# Patient Record
Sex: FEMALE | Race: WHITE | NOT HISPANIC OR LATINO | Employment: FULL TIME | URBAN - METROPOLITAN AREA
[De-identification: names, ages, dates, MRNs, and addresses within clinical notes are randomized per-mention and may not be internally consistent; named-entity substitution may affect disease eponyms.]

---

## 2017-09-28 ENCOUNTER — ANESTHESIA EVENT (OUTPATIENT)
Dept: SURGERY | Facility: CLINIC | Age: 47
End: 2017-09-28
Payer: COMMERCIAL

## 2017-09-28 NOTE — ANESTHESIA PREPROCEDURE EVALUATION
Anesthesia Evaluation     . Pt has had prior anesthetic. Type: General    History of anesthetic complications   - PONV        ROS/MED HX    ENT/Pulmonary:  - neg pulmonary ROS     Neurologic:       Cardiovascular:  - neg cardiovascular ROS       METS/Exercise Tolerance:     Hematologic:     (+) Other Hematologic Disorder-iron deficiency; on supplements      Musculoskeletal:         GI/Hepatic:  - neg GI/hepatic ROS       Renal/Genitourinary:         Endo:         Psychiatric:         Infectious Disease:         Malignancy:         Other:                     Physical Exam  Normal systems: cardiovascular, pulmonary and dental    Airway   Mallampati: I  TM distance: >3 FB  Neck ROM: full    Dental     Cardiovascular   Rhythm and rate: regular and normal      Pulmonary    breath sounds clear to auscultation                    Anesthesia Plan      History & Physical Review  History and physical reviewed and following examination; no interval change.    ASA Status:  2 .    NPO Status:  > 8 hours    Plan for General and ETT with Propofol and Intravenous induction. Maintenance will be TIVA.    PONV prophylaxis:  Ondansetron (or other 5HT-3) and Dexamethasone or Solumedrol  Tordal, Ketamine (0.15mg/kg pre-incision and q1H until emergence)      Postoperative Care  Postoperative pain management:  IV analgesics and Oral pain medications.      Consents  Anesthetic plan, risks, benefits and alternatives discussed with:  Patient..                          .  DPreop diagnosis: MENORRHAGIA AND SUBMUCOUS FIBROID   Procedure(s):  LAPAROSCOPIC ASSISTED HYSTERECTOMY VAGINAL  LAPAROSCOPIC SALPINGO-OOPHORECTOMY  LAPAROSCOPIC APPENDECTOMY  Allergies   Allergen Reactions     Sulfa Drugs        No current facility-administered medications on file prior to encounter.   No current outpatient prescriptions on file prior to encounter.  Hemoglobin   Date Value Ref Range Status   09/29/2017 14.2 11.7 - 15.7 g/dL Final

## 2017-09-29 ENCOUNTER — SURGERY (OUTPATIENT)
Age: 47
End: 2017-09-29

## 2017-09-29 ENCOUNTER — HOSPITAL ENCOUNTER (OUTPATIENT)
Facility: CLINIC | Age: 47
Discharge: HOME | End: 2017-09-29
Attending: OBSTETRICS & GYNECOLOGY | Admitting: OBSTETRICS & GYNECOLOGY
Payer: COMMERCIAL

## 2017-09-29 ENCOUNTER — ANESTHESIA (OUTPATIENT)
Dept: SURGERY | Facility: CLINIC | Age: 47
End: 2017-09-29
Payer: COMMERCIAL

## 2017-09-29 VITALS
DIASTOLIC BLOOD PRESSURE: 68 MMHG | OXYGEN SATURATION: 95 % | TEMPERATURE: 97.7 F | SYSTOLIC BLOOD PRESSURE: 104 MMHG | HEIGHT: 67 IN | BODY MASS INDEX: 23.61 KG/M2 | RESPIRATION RATE: 16 BRPM | WEIGHT: 150.4 LBS | HEART RATE: 74 BPM

## 2017-09-29 DIAGNOSIS — R58 EXCESSIVE BLEEDING: Primary | ICD-10-CM

## 2017-09-29 PROBLEM — N92.0 EXCESSIVE OR FREQUENT MENSTRUATION: Status: ACTIVE | Noted: 2017-09-29

## 2017-09-29 PROBLEM — D50.0 IRON DEFICIENCY ANEMIA DUE TO CHRONIC BLOOD LOSS: Status: ACTIVE | Noted: 2017-09-29

## 2017-09-29 LAB
ABO + RH BLD: NORMAL
ABO + RH BLD: NORMAL
BLD GP AB SCN SERPL QL: NORMAL
BLOOD BANK CMNT PATIENT-IMP: NORMAL
ERYTHROCYTE [DISTWIDTH] IN BLOOD BY AUTOMATED COUNT: 13.6 % (ref 10–15)
HCG SERPL QL: NEGATIVE
HCT VFR BLD AUTO: 40.4 % (ref 35–47)
HGB BLD-MCNC: 14.2 G/DL (ref 11.7–15.7)
MCH RBC QN AUTO: 30.2 PG (ref 26.5–33)
MCHC RBC AUTO-ENTMCNC: 35.1 G/DL (ref 31.5–36.5)
MCV RBC AUTO: 86 FL (ref 78–100)
PLATELET # BLD AUTO: 187 10E9/L (ref 150–450)
RBC # BLD AUTO: 4.7 10E12/L (ref 3.8–5.2)
SPECIMEN EXP DATE BLD: NORMAL
WBC # BLD AUTO: 5.9 10E9/L (ref 4–11)

## 2017-09-29 PROCEDURE — 25000125 ZZHC RX 250: Performed by: NURSE ANESTHETIST, CERTIFIED REGISTERED

## 2017-09-29 PROCEDURE — 25000125 ZZHC RX 250: Performed by: OBSTETRICS & GYNECOLOGY

## 2017-09-29 PROCEDURE — 88307 TISSUE EXAM BY PATHOLOGIST: CPT | Performed by: OBSTETRICS & GYNECOLOGY

## 2017-09-29 PROCEDURE — 85027 COMPLETE CBC AUTOMATED: CPT | Performed by: OBSTETRICS & GYNECOLOGY

## 2017-09-29 PROCEDURE — 88302 TISSUE EXAM BY PATHOLOGIST: CPT | Performed by: OBSTETRICS & GYNECOLOGY

## 2017-09-29 PROCEDURE — 25000128 H RX IP 250 OP 636: Performed by: ANESTHESIOLOGY

## 2017-09-29 PROCEDURE — 25000132 ZZH RX MED GY IP 250 OP 250 PS 637: Performed by: OBSTETRICS & GYNECOLOGY

## 2017-09-29 PROCEDURE — 25000128 H RX IP 250 OP 636: Performed by: OBSTETRICS & GYNECOLOGY

## 2017-09-29 PROCEDURE — 36000069 ZZH SURGERY LEVEL 5 EA 15 ADDTL MIN: Performed by: OBSTETRICS & GYNECOLOGY

## 2017-09-29 PROCEDURE — 27210794 ZZH OR GENERAL SUPPLY STERILE: Performed by: OBSTETRICS & GYNECOLOGY

## 2017-09-29 PROCEDURE — 84703 CHORIONIC GONADOTROPIN ASSAY: CPT | Performed by: OBSTETRICS & GYNECOLOGY

## 2017-09-29 PROCEDURE — 37000008 ZZH ANESTHESIA TECHNICAL FEE, 1ST 30 MIN: Performed by: OBSTETRICS & GYNECOLOGY

## 2017-09-29 PROCEDURE — 86900 BLOOD TYPING SEROLOGIC ABO: CPT | Performed by: OBSTETRICS & GYNECOLOGY

## 2017-09-29 PROCEDURE — 88302 TISSUE EXAM BY PATHOLOGIST: CPT | Mod: 26 | Performed by: OBSTETRICS & GYNECOLOGY

## 2017-09-29 PROCEDURE — 71000012 ZZH RECOVERY PHASE 1 LEVEL 1 FIRST HR: Performed by: OBSTETRICS & GYNECOLOGY

## 2017-09-29 PROCEDURE — 40000170 ZZH STATISTIC PRE-PROCEDURE ASSESSMENT II: Performed by: OBSTETRICS & GYNECOLOGY

## 2017-09-29 PROCEDURE — 86901 BLOOD TYPING SEROLOGIC RH(D): CPT | Performed by: OBSTETRICS & GYNECOLOGY

## 2017-09-29 PROCEDURE — 36000067 ZZH SURGERY LEVEL 5 1ST 30 MIN: Performed by: OBSTETRICS & GYNECOLOGY

## 2017-09-29 PROCEDURE — 86850 RBC ANTIBODY SCREEN: CPT | Performed by: OBSTETRICS & GYNECOLOGY

## 2017-09-29 PROCEDURE — 36415 COLL VENOUS BLD VENIPUNCTURE: CPT | Performed by: OBSTETRICS & GYNECOLOGY

## 2017-09-29 PROCEDURE — 27210995 ZZH RX 272: Performed by: OBSTETRICS & GYNECOLOGY

## 2017-09-29 PROCEDURE — 71000013 ZZH RECOVERY PHASE 1 LEVEL 1 EA ADDTL HR: Performed by: OBSTETRICS & GYNECOLOGY

## 2017-09-29 PROCEDURE — 25000128 H RX IP 250 OP 636: Performed by: NURSE ANESTHETIST, CERTIFIED REGISTERED

## 2017-09-29 PROCEDURE — 37000009 ZZH ANESTHESIA TECHNICAL FEE, EACH ADDTL 15 MIN: Performed by: OBSTETRICS & GYNECOLOGY

## 2017-09-29 PROCEDURE — 88307 TISSUE EXAM BY PATHOLOGIST: CPT | Mod: 26 | Performed by: OBSTETRICS & GYNECOLOGY

## 2017-09-29 PROCEDURE — 25800025 ZZH RX 258: Performed by: OBSTETRICS & GYNECOLOGY

## 2017-09-29 PROCEDURE — 25000132 ZZH RX MED GY IP 250 OP 250 PS 637: Performed by: ANESTHESIOLOGY

## 2017-09-29 PROCEDURE — 88305 TISSUE EXAM BY PATHOLOGIST: CPT | Performed by: OBSTETRICS & GYNECOLOGY

## 2017-09-29 PROCEDURE — 88305 TISSUE EXAM BY PATHOLOGIST: CPT | Mod: 26 | Performed by: OBSTETRICS & GYNECOLOGY

## 2017-09-29 RX ORDER — PROPOFOL 10 MG/ML
INJECTION, EMULSION INTRAVENOUS CONTINUOUS PRN
Status: DISCONTINUED | OUTPATIENT
Start: 2017-09-29 | End: 2017-09-29

## 2017-09-29 RX ORDER — EPHEDRINE SULFATE 50 MG/ML
INJECTION, SOLUTION INTRAMUSCULAR; INTRAVENOUS; SUBCUTANEOUS PRN
Status: DISCONTINUED | OUTPATIENT
Start: 2017-09-29 | End: 2017-09-29

## 2017-09-29 RX ORDER — OXYCODONE AND ACETAMINOPHEN 5; 325 MG/1; MG/1
1-2 TABLET ORAL
Status: COMPLETED | OUTPATIENT
Start: 2017-09-29 | End: 2017-09-29

## 2017-09-29 RX ORDER — MAGNESIUM HYDROXIDE 1200 MG/15ML
LIQUID ORAL PRN
Status: DISCONTINUED | OUTPATIENT
Start: 2017-09-29 | End: 2017-09-29 | Stop reason: HOSPADM

## 2017-09-29 RX ORDER — NEOSTIGMINE METHYLSULFATE 1 MG/ML
VIAL (ML) INJECTION PRN
Status: DISCONTINUED | OUTPATIENT
Start: 2017-09-29 | End: 2017-09-29

## 2017-09-29 RX ORDER — FENTANYL CITRATE 50 UG/ML
INJECTION, SOLUTION INTRAMUSCULAR; INTRAVENOUS PRN
Status: DISCONTINUED | OUTPATIENT
Start: 2017-09-29 | End: 2017-09-29

## 2017-09-29 RX ORDER — ESTRADIOL 0.05 MG/D
1 PATCH, EXTENDED RELEASE TRANSDERMAL
Qty: 24 PATCH | Refills: 3 | Status: SHIPPED | OUTPATIENT
Start: 2017-10-02 | End: 2023-04-10

## 2017-09-29 RX ORDER — ONDANSETRON 2 MG/ML
4 INJECTION INTRAMUSCULAR; INTRAVENOUS EVERY 30 MIN PRN
Status: DISCONTINUED | OUTPATIENT
Start: 2017-09-29 | End: 2017-09-29 | Stop reason: HOSPADM

## 2017-09-29 RX ORDER — ACETAMINOPHEN 325 MG/1
975 TABLET ORAL ONCE
Status: COMPLETED | OUTPATIENT
Start: 2017-09-29 | End: 2017-09-29

## 2017-09-29 RX ORDER — HYDROXYZINE HYDROCHLORIDE 25 MG/1
50 TABLET, FILM COATED ORAL ONCE
Status: COMPLETED | OUTPATIENT
Start: 2017-09-29 | End: 2017-09-29

## 2017-09-29 RX ORDER — IBUPROFEN 600 MG/1
600 TABLET, FILM COATED ORAL EVERY 6 HOURS PRN
Qty: 90 TABLET | Refills: 3 | Status: SHIPPED | OUTPATIENT
Start: 2017-09-29 | End: 2023-04-10

## 2017-09-29 RX ORDER — MEPERIDINE HYDROCHLORIDE 25 MG/ML
12.5 INJECTION INTRAMUSCULAR; INTRAVENOUS; SUBCUTANEOUS
Status: DISCONTINUED | OUTPATIENT
Start: 2017-09-29 | End: 2017-09-29 | Stop reason: HOSPADM

## 2017-09-29 RX ORDER — ONDANSETRON 4 MG/1
4 TABLET, ORALLY DISINTEGRATING ORAL EVERY 30 MIN PRN
Status: DISCONTINUED | OUTPATIENT
Start: 2017-09-29 | End: 2017-09-29 | Stop reason: HOSPADM

## 2017-09-29 RX ORDER — GLYCOPYRROLATE 0.2 MG/ML
INJECTION, SOLUTION INTRAMUSCULAR; INTRAVENOUS PRN
Status: DISCONTINUED | OUTPATIENT
Start: 2017-09-29 | End: 2017-09-29

## 2017-09-29 RX ORDER — CEFAZOLIN SODIUM 1 G/3ML
1 INJECTION, POWDER, FOR SOLUTION INTRAMUSCULAR; INTRAVENOUS SEE ADMIN INSTRUCTIONS
Status: DISCONTINUED | OUTPATIENT
Start: 2017-09-29 | End: 2017-09-29 | Stop reason: HOSPADM

## 2017-09-29 RX ORDER — KETOROLAC TROMETHAMINE 30 MG/ML
INJECTION, SOLUTION INTRAMUSCULAR; INTRAVENOUS PRN
Status: DISCONTINUED | OUTPATIENT
Start: 2017-09-29 | End: 2017-09-29

## 2017-09-29 RX ORDER — OXYCODONE AND ACETAMINOPHEN 5; 325 MG/1; MG/1
1-2 TABLET ORAL EVERY 4 HOURS PRN
Qty: 30 TABLET | Refills: 0 | Status: SHIPPED | OUTPATIENT
Start: 2017-09-29 | End: 2023-04-10

## 2017-09-29 RX ORDER — CEFAZOLIN SODIUM 2 G/100ML
2 INJECTION, SOLUTION INTRAVENOUS
Status: COMPLETED | OUTPATIENT
Start: 2017-09-29 | End: 2017-09-29

## 2017-09-29 RX ORDER — PROPOFOL 10 MG/ML
INJECTION, EMULSION INTRAVENOUS PRN
Status: DISCONTINUED | OUTPATIENT
Start: 2017-09-29 | End: 2017-09-29

## 2017-09-29 RX ORDER — LIDOCAINE HYDROCHLORIDE AND EPINEPHRINE 10; 10 MG/ML; UG/ML
INJECTION, SOLUTION INFILTRATION; PERINEURAL
Status: DISCONTINUED
Start: 2017-09-29 | End: 2017-09-29 | Stop reason: WASHOUT

## 2017-09-29 RX ORDER — OXYCODONE AND ACETAMINOPHEN 5; 325 MG/1; MG/1
1 TABLET ORAL ONCE
Status: COMPLETED | OUTPATIENT
Start: 2017-09-29 | End: 2017-09-29

## 2017-09-29 RX ORDER — FENTANYL CITRATE 50 UG/ML
25-50 INJECTION, SOLUTION INTRAMUSCULAR; INTRAVENOUS
Status: DISCONTINUED | OUTPATIENT
Start: 2017-09-29 | End: 2017-09-29 | Stop reason: HOSPADM

## 2017-09-29 RX ORDER — NALOXONE HYDROCHLORIDE 0.4 MG/ML
.1-.4 INJECTION, SOLUTION INTRAMUSCULAR; INTRAVENOUS; SUBCUTANEOUS
Status: DISCONTINUED | OUTPATIENT
Start: 2017-09-29 | End: 2017-09-29 | Stop reason: HOSPADM

## 2017-09-29 RX ORDER — ACYCLOVIR 200 MG/1
CAPSULE ORAL
Status: DISCONTINUED
Start: 2017-09-29 | End: 2017-09-29 | Stop reason: HOSPADM

## 2017-09-29 RX ORDER — CEPHALEXIN 500 MG/1
500 CAPSULE ORAL 2 TIMES DAILY
Qty: 10 CAPSULE | Refills: 0 | Status: SHIPPED | OUTPATIENT
Start: 2017-09-29 | End: 2017-10-04

## 2017-09-29 RX ORDER — HYDROMORPHONE HYDROCHLORIDE 1 MG/ML
.3-.5 INJECTION, SOLUTION INTRAMUSCULAR; INTRAVENOUS; SUBCUTANEOUS EVERY 10 MIN PRN
Status: DISCONTINUED | OUTPATIENT
Start: 2017-09-29 | End: 2017-09-29 | Stop reason: HOSPADM

## 2017-09-29 RX ORDER — FENTANYL CITRATE 50 UG/ML
25-50 INJECTION, SOLUTION INTRAMUSCULAR; INTRAVENOUS EVERY 5 MIN PRN
Status: DISCONTINUED | OUTPATIENT
Start: 2017-09-29 | End: 2017-09-29 | Stop reason: HOSPADM

## 2017-09-29 RX ORDER — LIDOCAINE HYDROCHLORIDE 20 MG/ML
INJECTION, SOLUTION INFILTRATION; PERINEURAL PRN
Status: DISCONTINUED | OUTPATIENT
Start: 2017-09-29 | End: 2017-09-29

## 2017-09-29 RX ORDER — ONDANSETRON 2 MG/ML
INJECTION INTRAMUSCULAR; INTRAVENOUS PRN
Status: DISCONTINUED | OUTPATIENT
Start: 2017-09-29 | End: 2017-09-29

## 2017-09-29 RX ORDER — CALCIUM CARBONATE 500(1250)
1 TABLET ORAL 2 TIMES DAILY
COMMUNITY

## 2017-09-29 RX ORDER — IBUPROFEN 600 MG/1
600 TABLET, FILM COATED ORAL
Status: DISCONTINUED | OUTPATIENT
Start: 2017-09-29 | End: 2017-09-29 | Stop reason: HOSPADM

## 2017-09-29 RX ORDER — SODIUM CHLORIDE, SODIUM LACTATE, POTASSIUM CHLORIDE, CALCIUM CHLORIDE 600; 310; 30; 20 MG/100ML; MG/100ML; MG/100ML; MG/100ML
INJECTION, SOLUTION INTRAVENOUS CONTINUOUS
Status: DISCONTINUED | OUTPATIENT
Start: 2017-09-29 | End: 2017-09-29 | Stop reason: HOSPADM

## 2017-09-29 RX ORDER — KETOROLAC TROMETHAMINE 30 MG/ML
30 INJECTION, SOLUTION INTRAMUSCULAR; INTRAVENOUS EVERY 6 HOURS PRN
Status: DISCONTINUED | OUTPATIENT
Start: 2017-09-29 | End: 2017-09-29 | Stop reason: HOSPADM

## 2017-09-29 RX ORDER — SODIUM CHLORIDE, SODIUM LACTATE, POTASSIUM CHLORIDE, CALCIUM CHLORIDE 600; 310; 30; 20 MG/100ML; MG/100ML; MG/100ML; MG/100ML
INJECTION, SOLUTION INTRAVENOUS CONTINUOUS PRN
Status: DISCONTINUED | OUTPATIENT
Start: 2017-09-29 | End: 2017-09-29

## 2017-09-29 RX ORDER — DEXAMETHASONE SODIUM PHOSPHATE 10 MG/ML
INJECTION, SOLUTION INTRAMUSCULAR; INTRAVENOUS PRN
Status: DISCONTINUED | OUTPATIENT
Start: 2017-09-29 | End: 2017-09-29

## 2017-09-29 RX ADMIN — Medication 1 APPLICATOR: at 09:45

## 2017-09-29 RX ADMIN — HYDROXYZINE HYDROCHLORIDE 50 MG: 25 TABLET ORAL at 07:11

## 2017-09-29 RX ADMIN — PROPOFOL 200 MG: 10 INJECTION, EMULSION INTRAVENOUS at 07:39

## 2017-09-29 RX ADMIN — FENTANYL CITRATE 25 MCG: 50 INJECTION, SOLUTION INTRAMUSCULAR; INTRAVENOUS at 11:43

## 2017-09-29 RX ADMIN — LIDOCAINE HYDROCHLORIDE 80 MG: 20 INJECTION, SOLUTION INFILTRATION; PERINEURAL at 07:39

## 2017-09-29 RX ADMIN — SODIUM CHLORIDE, POTASSIUM CHLORIDE, SODIUM LACTATE AND CALCIUM CHLORIDE: 600; 310; 30; 20 INJECTION, SOLUTION INTRAVENOUS at 08:59

## 2017-09-29 RX ADMIN — FENTANYL CITRATE 50 MCG: 50 INJECTION, SOLUTION INTRAMUSCULAR; INTRAVENOUS at 07:39

## 2017-09-29 RX ADMIN — SODIUM CHLORIDE, POTASSIUM CHLORIDE, SODIUM LACTATE AND CALCIUM CHLORIDE: 600; 310; 30; 20 INJECTION, SOLUTION INTRAVENOUS at 07:36

## 2017-09-29 RX ADMIN — SODIUM CHLORIDE 1000 ML: 0.9 IRRIGANT IRRIGATION at 08:22

## 2017-09-29 RX ADMIN — DEXMEDETOMIDINE HYDROCHLORIDE 8 MCG: 100 INJECTION, SOLUTION INTRAVENOUS at 08:02

## 2017-09-29 RX ADMIN — FENTANYL CITRATE 25 MCG: 50 INJECTION, SOLUTION INTRAMUSCULAR; INTRAVENOUS at 09:35

## 2017-09-29 RX ADMIN — MIDAZOLAM HYDROCHLORIDE 2 MG: 1 INJECTION, SOLUTION INTRAMUSCULAR; INTRAVENOUS at 07:36

## 2017-09-29 RX ADMIN — ACETAMINOPHEN 975 MG: 325 TABLET, FILM COATED ORAL at 07:10

## 2017-09-29 RX ADMIN — ONDANSETRON 4 MG: 2 INJECTION INTRAMUSCULAR; INTRAVENOUS at 09:39

## 2017-09-29 RX ADMIN — VASOPRESSIN 10 ML: 20 INJECTION INTRAMUSCULAR; SUBCUTANEOUS at 09:14

## 2017-09-29 RX ADMIN — ROCURONIUM BROMIDE 10 MG: 10 INJECTION INTRAVENOUS at 08:58

## 2017-09-29 RX ADMIN — OXYCODONE HYDROCHLORIDE AND ACETAMINOPHEN 1 TABLET: 5; 325 TABLET ORAL at 11:19

## 2017-09-29 RX ADMIN — FENTANYL CITRATE 25 MCG: 50 INJECTION, SOLUTION INTRAMUSCULAR; INTRAVENOUS at 07:57

## 2017-09-29 RX ADMIN — CEFAZOLIN SODIUM 2 G: 2 INJECTION, SOLUTION INTRAVENOUS at 07:45

## 2017-09-29 RX ADMIN — DEXMEDETOMIDINE HYDROCHLORIDE 8 MCG: 100 INJECTION, SOLUTION INTRAVENOUS at 08:07

## 2017-09-29 RX ADMIN — GLYCOPYRROLATE 0.4 MG: 0.2 INJECTION, SOLUTION INTRAMUSCULAR; INTRAVENOUS at 09:51

## 2017-09-29 RX ADMIN — FENTANYL CITRATE 50 MCG: 50 INJECTION, SOLUTION INTRAMUSCULAR; INTRAVENOUS at 10:22

## 2017-09-29 RX ADMIN — ROCURONIUM BROMIDE 30 MG: 10 INJECTION INTRAVENOUS at 07:39

## 2017-09-29 RX ADMIN — KETOROLAC TROMETHAMINE 30 MG: 30 INJECTION, SOLUTION INTRAMUSCULAR at 09:39

## 2017-09-29 RX ADMIN — OXYCODONE HYDROCHLORIDE AND ACETAMINOPHEN 1 TABLET: 5; 325 TABLET ORAL at 12:47

## 2017-09-29 RX ADMIN — SODIUM CHLORIDE 1000 ML: 900 IRRIGANT IRRIGATION at 09:15

## 2017-09-29 RX ADMIN — PROPOFOL 150 MCG/KG/MIN: 10 INJECTION, EMULSION INTRAVENOUS at 08:22

## 2017-09-29 RX ADMIN — CONJUGATED ESTROGENS 5 G: 0.62 CREAM VAGINAL at 09:30

## 2017-09-29 RX ADMIN — FENTANYL CITRATE 25 MCG: 50 INJECTION, SOLUTION INTRAMUSCULAR; INTRAVENOUS at 10:42

## 2017-09-29 RX ADMIN — ROCURONIUM BROMIDE 10 MG: 10 INJECTION INTRAVENOUS at 08:01

## 2017-09-29 RX ADMIN — Medication 5 MG: at 08:39

## 2017-09-29 RX ADMIN — NEOSTIGMINE METHYLSULFATE 3 MG: 1 INJECTION INTRAMUSCULAR; INTRAVENOUS; SUBCUTANEOUS at 09:51

## 2017-09-29 RX ADMIN — PROPOFOL 200 MCG/KG/MIN: 10 INJECTION, EMULSION INTRAVENOUS at 07:39

## 2017-09-29 RX ADMIN — DEXAMETHASONE SODIUM PHOSPHATE 4 MG: 10 INJECTION, SOLUTION INTRAMUSCULAR; INTRAVENOUS at 07:47

## 2017-09-29 RX ADMIN — DEXMEDETOMIDINE HYDROCHLORIDE 4 MCG: 100 INJECTION, SOLUTION INTRAVENOUS at 09:18

## 2017-09-29 NOTE — DISCHARGE INSTRUCTIONS
Same Day Surgery Discharge Instructions for  Sedation and General Anesthesia       It's not unusual to feel dizzy, light-headed or faint for up to 24 hours after surgery or while taking pain medication.  If you have these symptoms: sit for a few minutes before standing and have someone assist you when you get up to walk or use the bathroom.      You should rest and relax for the next 24 hours. We recommend you make arrangements to have an adult stay with you for at least 24 hours after your discharge.  Avoid hazardous and strenuous activity.      DO NOT DRIVE any vehicle or operate mechanical equipment for 24 hours following the end of your surgery.  Even though you may feel normal, your reactions may be affected by the medication you have received.      Do not drink alcoholic beverages for 24 hours following surgery.       Slowly progress to your regular diet as you feel able. It's not unusual to feel nauseated and/or vomit after receiving anesthesia.  If you develop these symptoms, drink clear liquids (apple juice, ginger ale, broth, 7-up, etc. ) until you feel better.  If your nausea and vomiting persists for 24 hours, please notify your surgeon.        All narcotic pain medications, along with inactivity and anesthesia, can cause constipation. Drinking plenty of liquids and increasing fiber intake will help.      For any questions of a medical nature, call your surgeon.      Do not make important decisions for 24 hours.      If you had general anesthesia, you may have a sore throat for a couple of days related to the breathing tube used during surgery.  You may use Cepacol lozenges to help with this discomfort.  If it worsens or if you develop a fever, contact your surgeon.       If you feel your pain is not well managed with the pain medications prescribed by your surgeon, please contact your surgeon's office to let them know so they can address your concerns.     While you were at the hospital today you were  given 975 mg of Tylenol. The recommended daily maximum dose is 4000 mg.     Discharge Instructions for Laparoscopic Hysterectomy    Incisional Care  A small amount of drainage from your incisions is normal. You may wear Band Aids until the drainage stops. The day after surgery, if your incisions are dry, remove the Band Aids. Leave the Steri-Strips (small pieces of tape) in place. Let them fall off on their own, or gently remove them after 7 days.  Once your have removed your Band Aids, you may shower as usual. Wash your incisions with mild soap and water. Pat dry.  Don't use oils, powders, or lotions on your incisions.  General Care  Take your medications exactly as directed by your doctor.    You may have vaginal bleeding that can last for several weeks. Use pads only. Don't put anything in your vagina (tampons, douches or have sexual intercourse) until your doctor says it's safe to do so.  Avoid constipation.  Eat fruits, vegetables, and whole grains.  Drink 6-8 glasses of water a day, unless told to do otherwise.  Use a laxative or a mild stool softener if your doctor says it's okay.  Activity  Don't drive while you are still taking narcotic pain medications.  Don t do strenuous activities until the doctor says it's okay.  Walk as often as you feel able.    Pain  Your incisions may be tender or sore. You may also have pain in your upper back or shoulders. This is from the gas used to enlarge your abdomen to allow your doctor to see inside your pelvis and perform the procedure. This pain usually goes away in a day or two.   When to Call Your Doctor  Call your doctor right away if you have any of the following:  Fever above 100.4 F (38 C) or chills  Vaginal bleeding that soaks more than one sanitary pad per hour  A foul smelling discharge from the vagina  Difficulty urinating  Severe pain   Redness, swelling, or drainage at your incision sites  Follow-Up  Make a follow-up appointment as directed by your  surgeon.    **If you have questions or concerns about your procedure,   call Dr Jimenez at  [phone]**        While you were at the hospital today you received Toradol, an antiinflammatory medication similar to Ibuprofen.  You should not take other antiinflammatory medication, such as Ibuprofen, Motrin, Advil, Aleve, Naprosyn, etc, until 4pm***.

## 2017-09-29 NOTE — ANESTHESIA POSTPROCEDURE EVALUATION
Patient: Carolina Denney    Procedure(s):  LAPAROSCOPIC ASSISTED VAGINAL HYSTERECTOMY; BILATERAL SALPINGECTOMY;  BILATERAL LAPAROSCOPIC OOPHORECTOMY AND LAPAROSCOPIC APPENDECTOMY, EXTENSIVE RESECTION OF ADHESIVE DISEASE - Wound Class: II-Clean Contaminated   - Wound Class: II-Clean Contaminated   - Wound Class: III-Contaminated   - Wound Class: II-Clean Contaminated    Diagnosis:MENORRHAGIA AND SUBMUCOUS FIBROID   Diagnosis Additional Information: No value filed.    Anesthesia Type:  General, ETT    Note:  Anesthesia Post Evaluation    Patient location during evaluation: PACU  Patient participation: Able to fully participate in evaluation  Level of consciousness: awake  Pain management: adequate  Airway patency: patent  Cardiovascular status: acceptable  Respiratory status: acceptable  Hydration status: acceptable  PONV: none     Anesthetic complications: None          Last vitals:  Vitals:    09/29/17 1115 09/29/17 1143 09/29/17 1145   BP: 112/77  105/63   Pulse:      Resp: 14 16 16   Temp: 36.5  C (97.7  F)     SpO2: 100%  94%         Electronically Signed By: Devan Mercer MD  September 29, 2017  12:12 PM

## 2017-09-29 NOTE — ANESTHESIA CARE TRANSFER NOTE
Patient: Carolina Denney    Procedure(s):  LAPAROSCOPIC ASSISTED VAGINAL HYSTERECTOMY; BILATERAL SALPINGECTOMY;  BILATERAL LAPAROSCOPIC OOPHORECTOMY AND LAPAROSCOPIC APPENDECTOMY, EXTENSIVE RESECTION OF ADHESIVE DISEASE - Wound Class: II-Clean Contaminated   - Wound Class: II-Clean Contaminated   - Wound Class: III-Contaminated   - Wound Class: II-Clean Contaminated    Diagnosis: MENORRHAGIA AND SUBMUCOUS FIBROID   Diagnosis Additional Information: No value filed.    Anesthesia Type:   General, ETT     Note:  Airway :Face Mask  Patient transferred to:PACU  Comments: Vss. Denies pain. Report given to RN assuming care      Vitals: (Last set prior to Anesthesia Care Transfer)    CRNA VITALS  9/29/2017 0928 - 9/29/2017 1006      9/29/2017             Pulse: 58    SpO2: 100 %    Resp Rate (observed): 10                Electronically Signed By: JANEE Martinez CRNA  September 29, 2017  10:06 AM

## 2017-09-29 NOTE — BRIEF OP NOTE
[facility] Brief Operative Note    Pre-operative diagnosis: MENORRHAGIA AND SUBMUCOUS FIBROID    Post-operative diagnosis Same,   Extensive pelvic adhesive disease  Abdominal nodule/lesions     Procedure: Procedure(s):  LAPAROSCOPIC ASSISTED VAGINAL HYSTERECTOMY; BILATERAL SALPINGECTOMY;  BILATERAL LAPAROSCOPIC OOPHORECTOMY AND LAPAROSCOPIC APPENDECTOMY, EXTENSIVE RESECTION OF ADHESIVE DISEASE - Wound Class: II-Clean Contaminated   - Wound Class: II-Clean Contaminated   - Wound Class: III-Contaminated   - Wound Class: II-Clean Contaminated   Excision of abdominal nodule   Surgeon(s): Surgeon(s) and Role:    Kayley Jimenez MD - Primary  Tammy Beckman, CST, 1st assistant   Estimated blood loss: 125 mL    Specimens:   ID Type Source Tests Collected by Time Destination   A : Abdominal lesion Tissue Abdomen SURGICAL PATHOLOGY EXAM Kayley Jimenez MD 9/29/2017  8:12 AM    B : Appendix Tissue Appendix SURGICAL PATHOLOGY EXAM Kayley Jimenez MD 9/29/2017  9:30 AM    C : Uterus, cervix, bilateral fallopian tubes and ovaries Tissue Uterus, Cervix and Bilateral Fallopian Tubes SURGICAL PATHOLOGY EXAM Kayley Jimenez MD 9/29/2017  9:16 AM       Findings: 210 gm uterus, tubes, ovaries  Appendix  Extensive adhesions to pelvis involving omentum, bowel    EBL  125cc

## 2017-09-29 NOTE — IP AVS SNAPSHOT
[facility] Day [facility]    [address]    [place] [address]    Phone:  [phone]    Fax:  [phone]                                       After Visit Summary   9/29/2017    Carolina Denney    MRN: [id number]           After Visit Summary Signature Page     I have received my discharge instructions, and my questions have been answered. I have discussed any challenges I see with this plan with the nurse or doctor.    ..........................................................................................................................................  Patient/Patient Representative Signature      ..........................................................................................................................................  Patient Representative Print Name and Relationship to Patient    ..................................................               ................................................  Date                                            Time    ..........................................................................................................................................  Reviewed by Signature/Title    ...................................................              ..............................................  Date                                                            Time

## 2017-09-29 NOTE — IP AVS SNAPSHOT
"                  MRN:[id number]                      After Visit Summary   9/29/2017    Carolina Denney    MRN: [id number]           Thank you!     Thank you for choosing [facility] for your care. Our goal is always to provide you with excellent care. Hearing back from our patients is one way we can continue to improve our services. Please take a few minutes to complete the written survey that you may receive in the mail after you visit with us. Thank you!        Patient Information     Date Of Birth          1970        About your hospital stay     You were admitted on:  September 29, 2017 You last received care in the:  [facility] Day [facility]    You were discharged on:  September 29, 2017       Who to Call     For medical emergencies, please call 911.  For non-urgent questions about your medical care, please call your primary care provider or clinic, [phone]  For questions related to your surgery, please call your surgery clinic        Attending Provider     Provider Kayley Vazquez MD OB/Gyn       Primary Care Provider Office Phone # Fax #    Deon Lovelace [phone]      After Care Instructions     Discharge Instructions       Everything went well.  You had a considerable amount of scar tissue which took longer.  Rest, walk around, rest, maintain pelvic rest. No tampons, douching or intercourse until your postop check in 4 wks.  Call if you have a fever to 101F, pass clots the size of \"oranges\" or have any questions, [phone].  Call in 1 wk for your pathology report and to schedule your 4 wk postop check.  Abby Choe MD            Patient care order       Place one of the estrogen patches to the patient's abdomen prior to discharge                  Further instructions from your care team       Same Day Surgery Discharge Instructions for  Sedation and General Anesthesia       It's not unusual to feel dizzy, light-headed or faint " for up to 24 hours after surgery or while taking pain medication.  If you have these symptoms: sit for a few minutes before standing and have someone assist you when you get up to walk or use the bathroom.      You should rest and relax for the next 24 hours. We recommend you make arrangements to have an adult stay with you for at least 24 hours after your discharge.  Avoid hazardous and strenuous activity.      DO NOT DRIVE any vehicle or operate mechanical equipment for 24 hours following the end of your surgery.  Even though you may feel normal, your reactions may be affected by the medication you have received.      Do not drink alcoholic beverages for 24 hours following surgery.       Slowly progress to your regular diet as you feel able. It's not unusual to feel nauseated and/or vomit after receiving anesthesia.  If you develop these symptoms, drink clear liquids (apple juice, ginger ale, broth, 7-up, etc. ) until you feel better.  If your nausea and vomiting persists for 24 hours, please notify your surgeon.        All narcotic pain medications, along with inactivity and anesthesia, can cause constipation. Drinking plenty of liquids and increasing fiber intake will help.      For any questions of a medical nature, call your surgeon.      Do not make important decisions for 24 hours.      If you had general anesthesia, you may have a sore throat for a couple of days related to the breathing tube used during surgery.  You may use Cepacol lozenges to help with this discomfort.  If it worsens or if you develop a fever, contact your surgeon.       If you feel your pain is not well managed with the pain medications prescribed by your surgeon, please contact your surgeon's office to let them know so they can address your concerns.     While you were at the hospital today you were given 975 mg of Tylenol. The recommended daily maximum dose is 4000 mg.     Discharge Instructions for Laparoscopic  Hysterectomy    Incisional Care  A small amount of drainage from your incisions is normal. You may wear Band Aids until the drainage stops. The day after surgery, if your incisions are dry, remove the Band Aids. Leave the Steri-Strips (small pieces of tape) in place. Let them fall off on their own, or gently remove them after 7 days.  Once your have removed your Band Aids, you may shower as usual. Wash your incisions with mild soap and water. Pat dry.  Don't use oils, powders, or lotions on your incisions.  General Care  Take your medications exactly as directed by your doctor.    You may have vaginal bleeding that can last for several weeks. Use pads only. Don't put anything in your vagina (tampons, douches or have sexual intercourse) until your doctor says it's safe to do so.  Avoid constipation.  Eat fruits, vegetables, and whole grains.  Drink 6-8 glasses of water a day, unless told to do otherwise.  Use a laxative or a mild stool softener if your doctor says it's okay.  Activity  Don't drive while you are still taking narcotic pain medications.  Don t do strenuous activities until the doctor says it's okay.  Walk as often as you feel able.    Pain  Your incisions may be tender or sore. You may also have pain in your upper back or shoulders. This is from the gas used to enlarge your abdomen to allow your doctor to see inside your pelvis and perform the procedure. This pain usually goes away in a day or two.   When to Call Your Doctor  Call your doctor right away if you have any of the following:  Fever above 100.4 F (38 C) or chills  Vaginal bleeding that soaks more than one sanitary pad per hour  A foul smelling discharge from the vagina  Difficulty urinating  Severe pain   Redness, swelling, or drainage at your incision sites  Follow-Up  Make a follow-up appointment as directed by your surgeon.    **If you have questions or concerns about your procedure,   call Dr Jimenez at  [phone]**        While you were  "at the hospital today you received Toradol, an antiinflammatory medication similar to Ibuprofen.  You should not take other antiinflammatory medication, such as Ibuprofen, Motrin, Advil, Aleve, Naprosyn, etc, until 4pm***.       Pending Results     Date and Time Order Name Status Description    2017 0813 Surgical pathology exam In process             Admission Information     Date & Time Provider Department Dept. Phone    2017 Kayley Jimenez MD [facility] Same Day Surgery [phone]      Your Vitals Were     Blood Pressure Pulse Temperature Respirations Height Weight    105/63 74 97.7  F (36.5  C) 16 1.702 m (5' 7\") 68.2 kg (150 lb 6.4 oz)    Last Period Pulse Oximetry BMI (Body Mass Index)             2017 94% 23.56 kg/m2         [employer] Information     [employer] lets you send messages to your doctor, view your test results, renew your prescriptions, schedule appointments and more. To sign up, go to [email or web address] . Click on \"Log in\" on the left side of the screen, which will take you to the Welcome page. Then click on \"Sign up Now\" on the right side of the page.     You will be asked to enter the access code listed below, as well as some personal information. Please follow the directions to create your username and password.     Your access code is: [email or web address]  Expires: 2017 11:53 AM     Your access code will  in 90 days. If you need help or a new code, please call your [facility] clinic or [phone].        Care EveryWhere ID     This is your Care EveryWhere ID. This could be used by other organizations to access your [facility] medical records  [id number]        Equal Access to Services     PHILL MAXWELL : Viv Howe, lynne garcia, poncho alex. So [facility] [phone].    ATENCIÓN: Si habla español, tiene a barr disposición servicios gratuitos de asistencia lingüística. Llame al " [phone].    We comply with applicable federal civil rights laws and [place] laws. We do not discriminate on the basis of race, color, national origin, age, disability sex, sexual orientation or gender identity.               Review of your medicines      START taking        Dose / Directions    cephALEXin 500 MG capsule   Commonly known as:  KEFLEX   Used for:  Excessive bleeding        Dose:  500 mg   Take 1 capsule (500 mg) by mouth 2 times daily for 5 days   Quantity:  10 capsule   Refills:  0       estradiol 0.05 MG/24HR BIW patch   Commonly known as:  VIVELLE-DOT   Used for:  Excessive bleeding        Dose:  1 patch   Start taking on:  10/2/2017   Place 1 patch onto the skin twice a week   Quantity:  24 patch   Refills:  3       ibuprofen 600 MG tablet   Commonly known as:  ADVIL/MOTRIN   Used for:  Excessive bleeding        Dose:  600 mg   Take 1 tablet (600 mg) by mouth every 6 hours as needed for pain (mild)   Quantity:  90 tablet   Refills:  3       oxyCODONE-acetaminophen 5-325 MG per tablet   Commonly known as:  PERCOCET   Used for:  Excessive bleeding        Dose:  1-2 tablet   Take 1-2 tablets by mouth every 4 hours as needed for pain (moderate to severe)   Quantity:  30 tablet   Refills:  0         CONTINUE these medicines which have NOT CHANGED        Dose / Directions    calcium carbonate 1250 MG tablet   Commonly known as:  OS-MICHAEL 500 mg [place]. Ca        Dose:  1 tablet   Take 1 tablet by mouth 2 times daily   Refills:  0       MULTIVITAMIN ADULTS PO        Refills:  0       VITAMIN D (CHOLECALCIFEROL) PO        Take by mouth daily   Refills:  0         STOP taking     vitamin B complex with vitamin C Tabs tablet           VITAMIN C PO           VITAMIN E COMPLEX PO                Where to get your medicines      Some of these will need a paper prescription and others can be bought over the counter. Ask your nurse if you have questions.     Bring a paper prescription for each of these  medications     cephALEXin 500 MG capsule    estradiol 0.05 MG/24HR BIW patch    ibuprofen 600 MG tablet    oxyCODONE-acetaminophen 5-325 MG per tablet               ANTIBIOTIC INSTRUCTION     You've Been Prescribed an Antibiotic - Now What?  Your healthcare team thinks that you or your loved one might have an infection. Some infections can be treated with antibiotics, which are powerful, life-saving drugs. Like all medications, antibiotics have side effects and should only be used when necessary. There are some important things you should know about your antibiotic treatment.      Your healthcare team may run tests before you start taking an antibiotic.    Your team may take samples (e.g., from your blood, urine or other areas) to run tests to look for bacteria. These test can be important to determine if you need an antibiotic at all and, if you do, which antibiotic will work best.      Within a few days, your healthcare team might change or even stop your antibiotic.    Your team may start you on an antibiotic while they are working to find out what is making you sick.    Your team might change your antibiotic because test results show that a different antibiotic would be better to treat your infection.    In some cases, once your team has more information, they learn that you do not need an antibiotic at all. They may find out that you don't have an infection, or that the antibiotic you're taking won't work against your infection. For example, an infection caused by a virus can't be treated with antibiotics. Staying on an antibiotic when you don't need it is more likely to be harmful than helpful.      You may experience side effects from your antibiotic.    Like all medications, antibiotics have side effects. Some of these can be serious.    Let you healthcare team know if you have any known allergies when you are admitted to the hospital.    One significant side effect of nearly all antibiotics is the risk of  severe and sometimes deadly diarrhea caused by Clostridium difficile (C. Difficile). This occurs when a person takes antibiotics because some good germs are destroyed. Antibiotic use allows C. diificile to take over, putting patients at high risk for this serious infection.    As a patient or caregiver, it is important to understand your or your loved one's antibiotic treatment. It is especially important for caregivers to speak up when patients can't speak for themselves. Here are some important questions to ask your healthcare team.    What infection is this antibiotic treating and how do you know I have that infection?    What side effects might occur from this antibiotic?    How long will I need to take this antibiotic?    Is it safe to take this antibiotic with other medications or supplements (e.g., vitamins) that I am taking?     Are there any special directions I need to know about taking this antibiotic? For example, should I take it with food?    How will I be monitored to know whether my infection is responding to the antibiotic?    What tests may help to make sure the right antibiotic is prescribed for me?      Information provided by:  www.cdc.gov/getsmart  U.S. Department of Health and Human Services  Centers for disease Control and Prevention  National Center for Emerging and Zoonotic Infectious Diseases  Division of Healthcare Quality Promotion         Protect others around you: Learn how to safely use, store and throw away your medicines at www.disposemymeds.org.             Medication List: This is a list of all your medications and when to take them. Check marks below indicate your daily home schedule. Keep this list as a reference.      Medications           Morning Afternoon Evening Bedtime As Needed    calcium carbonate 1250 MG tablet   Commonly known as:  OS-MICHAEL 500 mg [place]. Ca   Take 1 tablet by mouth 2 times daily                                cephALEXin 500 MG capsule   Commonly known as:   KEFLEX   Take 1 capsule (500 mg) by mouth 2 times daily for 5 days                                estradiol 0.05 MG/24HR BIW patch   Commonly known as:  VIVELLE-DOT   Place 1 patch onto the skin twice a week   Start taking on:  10/2/2017                                ibuprofen 600 MG tablet   Commonly known as:  ADVIL/MOTRIN   Take 1 tablet (600 mg) by mouth every 6 hours as needed for pain (mild)                                MULTIVITAMIN ADULTS PO                                oxyCODONE-acetaminophen 5-325 MG per tablet   Commonly known as:  PERCOCET   Take 1-2 tablets by mouth every 4 hours as needed for pain (moderate to severe)   Last time this was given:  1 tablet on 9/29/2017 11:19 AM                                VITAMIN D (CHOLECALCIFEROL) PO   Take by mouth daily

## 2017-09-30 NOTE — OP NOTE
Carolina Denney  1970 9/29/2017    DATE OF SURGERY:  9-29-17  LOCATION OF SERVICE:  [facility]    Preop Dx: Menorrhagia                   Submucous fibroid                   Iron deficiency    Postop DX:  Same                      Extensive pelvic adhesive disease                      Abdominal wall nodule    Procedure:  Laparoscopic assisted vaginal hysterectomy with bilateral salpingoophorectomy and appendectomy                      with resection of pelvic adhesive disease                      Excision of abdominal wall nodule    Anesthesia:  General with endotracheal intubation    Surgeon:  Kayley Jimenez MD    Assistant: Tammy Beckman CST    Indications for Procedure:   The patient understands the indications for the procedure.  She understands the risk of anesthesia, violation of organs, bleeding, infection, potential for requiring a larger procedure which may require additional incisions. She understands she will not be able to become pregnant as a result.  She understands she will be menopausal as a result of removal of both ovaries. The pros/cons/risks/benefits/alternatives and the potential complications were discussed with the patient and she agreed to proceed.    Operative Findings:    1.  210 gm uterus  2.  Normal ovaries and fallopian tubes  3.  Adhesions of the appendix  4.  Nodule on abdominal wall    Operative Procedure:  The patient was brought to the operating room where following general anesthesia was placed in the dorsolithotomy position where she was prepped and draped.  A verma catheter was placed.  A speculum was placed, the cervix was grasped, and a uterine elevator was placed.    Attention was directed to the patient's abdomen where a nodule was noted to the left of the umbilicus.  It was removed in an elliptical fashion and submitted to pathology for microscopic analysis.  The skin was closed with 4-0 monocryl in a subcuticular fashion.      An infraumbilical incision  was placed.  The step veress was placed into the abdomen which was confirmed by opening pressure of 6mmHg.  The abdomen was insufflated with 2.5 liters of gas.  The veress was removed and the step trocar was placed and confirmed by direct visualization.    Three accessory ports were placed in the right and left lower quadrants as well as the suprapubic site all under direct visualization.    The upper abdomen was explored with a normal appearing liver and gallbladder.  The appendix was visualized and was adherent to the abdominal pelvic brim.  The tip of the appendix was grasped and the mesoappendix was developed with resection of the adhesions.  It was developed to the cecum.  Three endoloops of O-vicryl were placed at the base of the appendix.  The appendix was transected inbetween the second and third endoloops.  The appendix was placed in the posterior culdesac.    Attention was directed to the pelvis.  The anterior culdesac was partially obscured by omental adhesions extending to the left broad ligament and skewing the round and broad ligaments.  The posterior culdesac was within normal limits.  The right adnexa was mobile.  The left adnexa was mobile.  The uterus was enlarged and bulky.    Attention was directed to the adhesive disease involving the omentum and the anterior culdesac.  The omentum was skeletonized and developed to provide visualization of the anatomy.      Then attention was directed to the left adnexa where the course of the ureter was visualized. The ovarian vessels were cauterized and transected using ligasure.  This was developed to the cornua.  This was repeated on the right adnexa.    The round ligaments were cauterized and transected with the left round ligament was diverted in a loop due to the adhesive formation.  The broad ligament was skeletonized to the level of the uterine vessels.  The bladder reflection was sharply developed and hydrodissected.  The uterine vessels were  cauterized and transected.  At this point we were able to proceed vaginally.    The patient was repositioned.  A weighted speculum was placed.  Stay sutures were placed at the 3 and 9 o'clock positions.  The cervix was infiltrated with a dilute solution of vasopressin.  The cervix was circumscribed and bluntly and sharply dissected.  The anterior culdesac was entered and a right angle retractor was placed to protect the bladder.  The posterior culdesac was entered and hemostatically stitched to the posterior vaginal cuff.  A long nose weighted speculum was placed.    The cervix was serially cauterized with ligasure bilaterally.  The remaining pedicles were clamped, cut and replaced with transfixion sutures of O vicryl.  All pedicles were hemostatic.  The specimen was removed in total.     The peritoneum was closed with a pursestring stitch of 3-0 monocryl.  The vaginal cuff was closed with 0 vicryl in a hemostatic fashion.  A second layer was imbricated with 0 vicryl.   Premarin vaginal cream was placed into the vagina.    Attention was redirected to the patient's abdomen which was reinsufflated.  The pedicles were hemostatic.  The accessory ports were removed under direct visualization, the CO2 gas was allowed to escape, and the laparoscope and its sheath were removed under direct visualization.  The ports were closed with 4-0 monocryl.    All sponge, needle, and instrument counts were correct.  The estimated blood loss was 125 cc.  The patient was awakened and removed from the operating room in stable condition.    Kayley Jimenez MD

## 2017-10-02 LAB — COPATH REPORT: NORMAL

## 2019-05-30 ENCOUNTER — HOSPITAL ENCOUNTER (EMERGENCY)
Facility: CLINIC | Age: 49
Discharge: HOME | End: 2019-05-30
Attending: EMERGENCY MEDICINE | Admitting: EMERGENCY MEDICINE
Payer: COMMERCIAL

## 2019-05-30 ENCOUNTER — APPOINTMENT (OUTPATIENT)
Dept: GENERAL RADIOLOGY | Facility: CLINIC | Age: 49
End: 2019-05-30
Attending: EMERGENCY MEDICINE
Payer: COMMERCIAL

## 2019-05-30 ENCOUNTER — APPOINTMENT (OUTPATIENT)
Dept: CT IMAGING | Facility: CLINIC | Age: 49
End: 2019-05-30
Attending: EMERGENCY MEDICINE
Payer: COMMERCIAL

## 2019-05-30 VITALS
DIASTOLIC BLOOD PRESSURE: 79 MMHG | SYSTOLIC BLOOD PRESSURE: 120 MMHG | HEIGHT: 67 IN | BODY MASS INDEX: 23.54 KG/M2 | TEMPERATURE: 98.8 F | WEIGHT: 150 LBS | RESPIRATION RATE: 16 BRPM | OXYGEN SATURATION: 99 %

## 2019-05-30 DIAGNOSIS — S22.41XA CLOSED FRACTURE OF MULTIPLE RIBS OF RIGHT SIDE, INITIAL ENCOUNTER: ICD-10-CM

## 2019-05-30 LAB
ANION GAP SERPL CALCULATED.3IONS-SCNC: 11 MMOL/L (ref 6–17)
B-HCG FREE SERPL-ACNC: <5 IU/L
BUN SERPL-MCNC: 15 MG/DL (ref 7–30)
CA-I BLD-SCNC: 4.8 MG/DL (ref 4.4–5.2)
CHLORIDE BLD-SCNC: 101 MMOL/L (ref 94–109)
CO2 BLD-SCNC: 27 MMOL/L (ref 20–32)
CREAT BLD-MCNC: 0.8 MG/DL (ref 0.52–1.04)
GFR SERPL CREATININE-BSD FRML MDRD: 76 ML/MIN/{1.73_M2}
GLUCOSE BLD-MCNC: 90 MG/DL (ref 70–99)
HCT VFR BLD CALC: 40 %PCV (ref 35–47)
HGB BLD CALC-MCNC: 13.6 G/DL (ref 11.7–15.7)
POTASSIUM BLD-SCNC: 3.9 MMOL/L (ref 3.4–5.3)
SODIUM BLD-SCNC: 139 MMOL/L (ref 133–144)

## 2019-05-30 PROCEDURE — 85014 HEMATOCRIT: CPT

## 2019-05-30 PROCEDURE — 73562 X-RAY EXAM OF KNEE 3: CPT | Mod: LT

## 2019-05-30 PROCEDURE — 74177 CT ABD & PELVIS W/CONTRAST: CPT

## 2019-05-30 PROCEDURE — 84702 CHORIONIC GONADOTROPIN TEST: CPT

## 2019-05-30 PROCEDURE — 71101 X-RAY EXAM UNILAT RIBS/CHEST: CPT | Mod: RT

## 2019-05-30 PROCEDURE — 73630 X-RAY EXAM OF FOOT: CPT | Mod: RT

## 2019-05-30 PROCEDURE — 99285 EMERGENCY DEPT VISIT HI MDM: CPT | Mod: 25

## 2019-05-30 PROCEDURE — 25000128 H RX IP 250 OP 636: Performed by: EMERGENCY MEDICINE

## 2019-05-30 PROCEDURE — 25000132 ZZH RX MED GY IP 250 OP 250 PS 637: Performed by: EMERGENCY MEDICINE

## 2019-05-30 PROCEDURE — 80047 BASIC METABLC PNL IONIZED CA: CPT

## 2019-05-30 RX ORDER — IOPAMIDOL 755 MG/ML
500 INJECTION, SOLUTION INTRAVASCULAR ONCE
Status: COMPLETED | OUTPATIENT
Start: 2019-05-30 | End: 2019-05-30

## 2019-05-30 RX ORDER — ACETAMINOPHEN 325 MG/1
975 TABLET ORAL ONCE
Status: COMPLETED | OUTPATIENT
Start: 2019-05-30 | End: 2019-05-30

## 2019-05-30 RX ADMIN — SODIUM CHLORIDE 59 ML: 9 INJECTION, SOLUTION INTRAVENOUS at 20:26

## 2019-05-30 RX ADMIN — IOPAMIDOL 75 ML: 755 INJECTION, SOLUTION INTRAVENOUS at 20:26

## 2019-05-30 RX ADMIN — ACETAMINOPHEN 975 MG: 325 TABLET, FILM COATED ORAL at 19:43

## 2019-05-30 ASSESSMENT — ENCOUNTER SYMPTOMS
ABDOMINAL PAIN: 1
ARTHRALGIAS: 1
BACK PAIN: 0

## 2019-05-30 ASSESSMENT — MIFFLIN-ST. JEOR: SCORE: 1328.03

## 2019-05-30 NOTE — ED TRIAGE NOTES
Right ankle in a boot after surgery 4 weeks ago.  While boot was off and using crutches, walking down the stairs and fell approximately 4 stairs. Injured right side chest and into the flank area. No SOB but pain with deep breathing.  Occurred about 1730.  No LOC.

## 2019-05-30 NOTE — ED AVS SNAPSHOT
[facility] Department  [address]  [facility] [address]  Phone:  [phone]  Fax:  [phone]                                    Carolina Denney   MRN: [id number]    Department:  [facility] Emergency Department   Date of Visit:  5/30/2019           After Visit Summary Signature Page    I have received my discharge instructions, and my questions have been answered. I have discussed any challenges I see with this plan with the nurse or doctor.    ..........................................................................................................................................  Patient/Patient Representative Signature      ..........................................................................................................................................  Patient Representative Print Name and Relationship to Patient    ..................................................               ................................................  Date                                   Time    ..........................................................................................................................................  Reviewed by Signature/Title    ...................................................              ..............................................  Date                                               Time          22EPIC Rev 08/18

## 2019-05-31 NOTE — ED PROVIDER NOTES
"  History     Chief Complaint:  Fall      HPI   Carolina Denney is a 49 year old female with recent right trimalleolar fracture repair who presents with fall. The patient reports that she was walking down her house stairs with her crutches when she toppled down the stairs from a height of about 7-8 ft per her  and landed on a statue at the bottom of the stairs on to her right side. Now she complains of right upper quadrant pain and right rib pain as well as left knee pain. Patient denies back pain or head trauma. Of note, the patient also mentions that she was not wearing her walking boot at the time and concerned that she might have injured her foot because she says it feels like she is walking on a \"pebble\".        Allergies:  Sulfa drugs     Medications:    calcium carbonate (OS-MICHAEL 500 MG [place]. [place]) 1250 MG tablet  estradiol (VIVELLE-DOT) 0.05 MG/24HR BIW patch  ibuprofen (ADVIL/MOTRIN) 600 MG tablet  Multiple Vitamins-Minerals (MULTIVITAMIN ADULTS PO)  oxyCODONE-acetaminophen (PERCOCET) 5-325 MG per tablet  VITAMIN D, CHOLECALCIFEROL, PO    Past Medical History:    Arthritis   Fatigue   Moodiness   Rosacea   Submucous uterine fibroid   Vaginal bleeding    Past Surgical History:    ABDOMEN SURGERY    LAPAROSCOPIC APPENDECTOMY N/A 9/29/2017  LAPAROSCOPIC ASSISTED HYSTERECTOMY VAGINAL  LAPAROSCOPIC OOPHORECTOMY AND LAPAROSCOPIC APPENDECTOMY, EXTENSIVE  LAPAROSCOPIC RESECTION ABDOMINAL PERINEAL  LAPAROSCOPIC SALPINGO-OOPHORECTOMY    Family History:    History reviewed. No pertinent family history.     Social History:  Smoking status: Former smoker  Alcohol use: yes  Marital Status:   [2]       Review of Systems   Gastrointestinal: Positive for abdominal pain.   Musculoskeletal: Positive for arthralgias. Negative for back pain.   All other systems reviewed and are negative.      Physical Exam     Patient Vitals for the past 24 hrs:   BP Temp Temp src Heart Rate Resp SpO2 Height Weight   05/30/19 2200 " "120/79 -- -- -- -- 99 % -- --   05/30/19 1856 131/77 98.8  F (37.1  C) Oral 81 16 98 % 1.702 m (5' 7\") 68 kg (150 lb)         Physical Exam  Constitutional: vitals reviewed  Eyes: EOMI  ENT: No oropharyngeal lacerations  Respiratory: Normal effort, symmetric chest rise, breath sounds equal bilaterally  Cardiovascular: Distal pulses palpable and symmetric, distal extremities warm, heart rate normal and rhythm regular  Gastrointestinal: Tenderness to palpation in the right upper quadrant without guarding  MSK: Full ROM of neck without pain or rigidity, no thoracic or lumbar spine tenderness or step offs, there is tenderness to palpation of the anterior and lateral right chest wall without step-offs, crepitus.  Tenderness to palpation of the left patella without step-off.  Patient is able to straight leg raise on his side.  Full range of motion of the knee.  No other bony tenderness.  There is tenderness to palpation at the base of the fifth MTP on the right side without underlying step-off or swelling.  Skin: Abrasions over the right thoracic back  Neurologic: Alert and oriented, follows commands in all extremities, sensation to light touch intact in all extremities, full strength and coordination        Emergency Department Course     Imaging:  Radiographic findings were communicated with the patient who voiced understanding of the findings.    XR knee left  IMPRESSION: There is a calcified density projecting over the anterior  aspect of the tibial plateau that could represent a calcified  intra-articular loose body. No evidence for fracture or dislocation of  the left knee.  As read by radiology     Foot XR right  IMPRESSION: No evidence for fracture, dislocation or significant  degenerative change of the right foot.  As read by radiology     Ribs XR, unilat 3 views and PA chest, right  IMPRESSION: Clear lungs. There are minimally displaced fractures of  the anterolateral aspects of the right 9th and 10th ribs.  As " read by radiology     Abd/pelvis CT  IMPRESSION:  1. A small region of hazy opacity in the subcutaneous fat of the  lateral aspect of the mid right hemiabdomen, possibly representing a  contusion.  2. No other findings suspicious for acute trauma in the abdomen or  Pelvis.  As read by radiology     Laboratory:  ISTAT HCG quantitative pregnancy POCT: <5.0  ISTAT basic Met ICa hematocrit POCT: WNL     Interventions:  1943: Tylenol 975 mg PO  2027: NaCl bolus 59 ml IV    Emergency Department Course:  Past medical records, nursing notes, and vitals reviewed.  1902: I performed an exam of the patient and obtained history, as documented above.    IV inserted and blood drawn.    The patient was sent for a XR knee left, Root XR right, Ribs XR and PA chest, Abd/pelvis CT while in the emergency department, findings above.    2210: I rechecked the patient. Explained findings to the patient. Patient discharged home with instructions regarding supportive care, medications, and reasons to return. The importance of close follow-up was reviewed.     Impression & Plan      Medical Decision Making:  Patient who presents with right-sided thoracic and flank pain after a fall downstairs.  Her exam is concerning for possible rib fracture versus injury to the right upper quadrant abdominal region.  She also has some extremity findings on the bilateral lower extremities that raise concern for specific areas of injury.  The rest of her exam is unremarkable and vitals are unremarkable.  She was given Tylenol in the emergency department and imaging was performed of the concerning areas.  This revealed fractures of the anterior lateral ninth and 10th rib on the right side.  No other underlying injuries.  CT scan of the abdomen shows no evidence of intra-abdominal the patient had good analgesia after the medications and is able to tolerate ambulation.  She had recent surgery, so has prescriptions for oxycodone for home for breakthrough pain,  and incentive spirometer, MiraLAX.  She was instructed to follow-up with her regular doctor in the next couple weeks.  Return precautions for any shortness of breath, worsening pain, infectious symptoms.  She left the emergency department in stable condition.      Diagnosis:    ICD-10-CM    1. Closed fracture of multiple ribs of right side, initial encounter S22.41XA        Disposition:  discharged to home      Galo Consuelo  5/30/2019   [facility] EMERGENCY DEPARTMENT    Scribe Disclosure:  I, Galo Cordero, am serving as a scribe at 7:02 PM on 5/30/2019 to document services personally performed by Alejandro Evans MD based on my observations and the provider's statements to me.        Alejandro Evans MD  05/31/19 0038

## 2021-01-29 ENCOUNTER — AMBULATORY - HEALTHEAST (OUTPATIENT)
Dept: NURSING | Facility: CLINIC | Age: 51
End: 2021-01-29

## 2021-02-19 ENCOUNTER — AMBULATORY - HEALTHEAST (OUTPATIENT)
Dept: NURSING | Facility: CLINIC | Age: 51
End: 2021-02-19

## 2021-07-31 ENCOUNTER — HEALTH MAINTENANCE LETTER (OUTPATIENT)
Age: 51
End: 2021-07-31

## 2021-09-25 ENCOUNTER — HEALTH MAINTENANCE LETTER (OUTPATIENT)
Age: 51
End: 2021-09-25

## 2022-08-27 ENCOUNTER — HEALTH MAINTENANCE LETTER (OUTPATIENT)
Age: 52
End: 2022-08-27

## 2022-12-26 ENCOUNTER — HEALTH MAINTENANCE LETTER (OUTPATIENT)
Age: 52
End: 2022-12-26

## 2023-04-10 ENCOUNTER — OFFICE VISIT (OUTPATIENT)
Dept: FAMILY MEDICINE | Facility: CLINIC | Age: 53
End: 2023-04-10

## 2023-04-10 VITALS
OXYGEN SATURATION: 99 % | HEART RATE: 70 BPM | HEIGHT: 67 IN | WEIGHT: 161.6 LBS | SYSTOLIC BLOOD PRESSURE: 118 MMHG | BODY MASS INDEX: 25.36 KG/M2 | DIASTOLIC BLOOD PRESSURE: 74 MMHG | TEMPERATURE: 98.4 F

## 2023-04-10 DIAGNOSIS — Z71.89 ACP (ADVANCE CARE PLANNING): ICD-10-CM

## 2023-04-10 DIAGNOSIS — Z76.89 HEALTH CARE HOME: ICD-10-CM

## 2023-04-10 DIAGNOSIS — H66.002 NON-RECURRENT ACUTE SUPPURATIVE OTITIS MEDIA OF LEFT EAR WITHOUT SPONTANEOUS RUPTURE OF TYMPANIC MEMBRANE: Primary | ICD-10-CM

## 2023-04-10 PROCEDURE — 99203 OFFICE O/P NEW LOW 30 MIN: CPT | Performed by: PHYSICIAN ASSISTANT

## 2023-04-10 NOTE — PROGRESS NOTES
Assessment & Plan     ACP (advance care planning)      Health Care Home      Non-recurrent acute suppurative otitis media of left ear without spontaneous rupture of tympanic membrane-pt has history of ear infections, PE suggests early infection, will treat given recent worsening  -Rest, increase fluids, honey for cough suppression/sore throat  -Add Mucinex and Sudafed D for symptomatic relief  -Ibuprofen/Tylenol as needed for symptomatic relief  Seek emergency care for significant shortness of breath, chest pain, and/or fever >103F that cannot be controlled with antipyretics     - amoxicillin-clavulanate (AUGMENTIN) 875-125 MG tablet  Dispense: 14 tablet; Refill: 0           Follow up as needed    No follow-ups on file.    Jonathan Bermudez, MICHEL  [facility]    Subjective   Carolina is a 53 year old, presenting for the following health issues:  New Patient (New patient to our clinic ), URI (Chest cold with cough, runny nose, cough is productive, chest gets tight at times, yellow phlegm, symptoms started 2 weeks ago), and Ear Problem (Left ear pressure and plugged )         View : No data to display.              HPI     Hx of pneumonia and OM (as a child and adult)  Current issues started in her chest, then moved into L ear  Pt was getting better, now is worsening    Acute Illness  Acute illness concerns: L ear pain, chest congestion (worsening)  Onset/Duration: 13 days  Symptoms:  Fever: No  Chills/Sweats: No  Headache (location?): No  Sinus Pressure: No  Conjunctivitis:  No  Ear Pain: No, L ear pressure  Rhinorrhea: YES  Congestion: YES  Sore Throat: No  Cough: YES-productive of yellow sputum  Wheeze: No  Decreased Appetite: No  Nausea: No  Vomiting: No  Diarrhea: No  Dysuria/Freq.: No  Dysuria or Hematuria: No  Fatigue/Achiness: YES  Sick/Strep Exposure: No  Therapies tried and outcome: Increasing fluids, Mucinex, Dayquil    Review of Systems   Constitutional, HEENT, cardiovascular, pulmonary, gi and  "gu systems are negative, except as otherwise noted.      Objective    /74 (BP Location: Right arm, Patient Position: Sitting, Cuff Size: Adult Large)   Pulse 70   Temp 98.4  F (36.9  C) (Temporal)   Ht 1.702 m (5' 7\")   Wt 73.3 kg (161 lb 9.6 oz)   LMP 08/28/2017   SpO2 99%   BMI 25.31 kg/m    Body mass index is 25.31 kg/m .  Physical Exam   GENERAL: healthy, alert and no distress  HENT: normal cephalic/atraumatic, right ear: erythematous and bulging membrane, left ear: erythematous and bulging membrane, nose and mouth without ulcers or lesions, oropharynx clear and oral mucous membranes moist  NECK: no adenopathy, no asymmetry, masses, or scars and thyroid normal to palpation  RESP: lungs clear to auscultation - no rales, rhonchi or wheezes  CV: regular rate and rhythm, normal S1 S2, no S3 or S4, no murmur, click or rub, no peripheral edema and peripheral pulses strong  ABDOMEN: soft, nontender, no hepatosplenomegaly, no masses and bowel sounds normal  MS: no gross musculoskeletal defects noted, no edema  SKIN: no suspicious lesions or rashes  NEURO: Normal strength and tone, mentation intact and speech normal                  "

## 2023-04-10 NOTE — NURSING NOTE
Chief Complaint   Patient presents with     New Patient     New patient to our clinic      URI     Chest cold with cough, runny nose, cough is productive, chest gets tight at times, yellow phlegm, symptoms started 2 weeks ago     Ear Problem     Left ear pressure and plugged      Pre-visit Screening:  Immunizations:  up to date  Colonoscopy:  is due and to be scheduled by patient for later completion  Mammogram: is due and to be scheduled by patient for later completion  Asthma Action Test/Plan:  NA  PHQ9:  NA  GAD7:  NA  Questioned patient about current smoking habits Pt. has never smoked.  Ok to leave detailed message on voice mail for today's visit only Yes, phone # [phone]

## 2023-04-10 NOTE — LETTER
[facility]  [address]  [address]  [place] [facility] [address]  [phone]      April 10, 2023      Carolina Denney  [address] [address]   [place] [address]      EMERGENCY CARE PLAN  Presenting Problem Treatment Plan   Questions or concerns during clinic hours I will call the clinic directly:    [facility]  [address] , [address]  [place] [address]  [phone]   Questions or concerns outside clinic hours  I will call the 24 hour line at [phone]   Patient needs to schedule an appointment  I will call the  scheduling line at [phone]   Same day treatment   I will call the clinic first, then  urgent care and/or  express care if needed   Clinic Care Coordinators Radha Munoz RN:  [phone]  [facility] Clinical Support Staff: [phone]    Crisis Services:  Behavioral or Mental Health [facility] (Behavioral Health Providers)   [phone]   Emergency treatment--Immediately CALL [phone]

## 2023-09-17 ENCOUNTER — HEALTH MAINTENANCE LETTER (OUTPATIENT)
Age: 53
End: 2023-09-17

## 2024-06-17 PROBLEM — Z76.89 HEALTH CARE HOME: Status: RESOLVED | Noted: 2023-04-10 | Resolved: 2024-06-17

## 2024-09-01 ENCOUNTER — HEALTH MAINTENANCE LETTER (OUTPATIENT)
Age: 54
End: 2024-09-01

## 2024-11-10 ENCOUNTER — HEALTH MAINTENANCE LETTER (OUTPATIENT)
Age: 54
End: 2024-11-10

## (undated) DEVICE — NDL 19GA 1.5"

## (undated) DEVICE — SUCTION TIP YANKAUER W/O VENT K86

## (undated) DEVICE — NDL INSUFFLATION 14GA STEP S100000

## (undated) DEVICE — EVAC SYSTEM CLEAR FLOW SC082500

## (undated) DEVICE — NDL COUNTER 40CT  31142311

## (undated) DEVICE — SU VICRYL 0 CT-1 CR 8X18" J740D

## (undated) DEVICE — SU MONOCRYL 3-0 SH 27" UND Y416H

## (undated) DEVICE — DRAPE VAGI BAG 18X9" 1072

## (undated) DEVICE — WIPES FOLEY CARE SURESTEP PROVON DFC100

## (undated) DEVICE — ESU LIGASURE LAPAROSCOPIC BLUNT TIP SEALER 5MMX37CM LF1637

## (undated) DEVICE — DRSG STERI STRIP 1/2X4" R1547

## (undated) DEVICE — Device

## (undated) DEVICE — SOL NACL 0.9% INJ 1000ML BAG 2B1324X

## (undated) DEVICE — SUCTION CANISTER MEDIVAC LINER 3000ML W/LID 65651-530

## (undated) DEVICE — ESU LIGASURE IMPACT OPEN SEALER/DVDR CVD LG JAW 36MM LF4318

## (undated) DEVICE — CATH TRAY FOLEY SURESTEP 16FR WDRAIN BAG STLK LATEX A300316A

## (undated) DEVICE — ESU HOLDER LAP INST DISP PURPLE LONG 330MM H-PRO-330

## (undated) DEVICE — PACK SET-UP STD 9102

## (undated) DEVICE — GLOVE PROTEXIS W/NEU-THERA 6.5  2D73TE65

## (undated) DEVICE — SU VICRYL 0 ENDOLOOP 18" EJ10

## (undated) DEVICE — SU MONOCRYL 4-0 PS-2 18" UND Y496G

## (undated) DEVICE — PREP DURAPREP 26ML APL 8630

## (undated) DEVICE — GLOVE PROTEXIS MICRO 6.5  2D73PM65

## (undated) DEVICE — SU VICRYL 0 CT-1 27" J340H

## (undated) DEVICE — ESU PENCIL W/HOLSTER E2350H

## (undated) DEVICE — SPONGE RAY-TEC 4X8" 7318

## (undated) DEVICE — SUCTION IRR TRUMPET VALVE LAP ASU1201

## (undated) DEVICE — LINEN TOWEL PACK X5 5464

## (undated) DEVICE — SYR 50ML CATH TIP W/O NDL 309620

## (undated) DEVICE — GLOVE PROTEXIS POWDER FREE 6.5 ORTHOPEDIC 2D73ET65

## (undated) DEVICE — SU DERMABOND MINI DHVM12

## (undated) DEVICE — SOL NACL 0.9% IRRIG 1000ML BOTTLE 07138-09

## (undated) DEVICE — ENDO TROCAR 11MM VERSASTEP VS101011P

## (undated) DEVICE — ESU CORD MONOPOLAR 10'  E0510

## (undated) RX ORDER — CEFAZOLIN SODIUM 2 G/100ML
INJECTION, SOLUTION INTRAVENOUS
Status: DISPENSED
Start: 2017-09-29

## (undated) RX ORDER — ACETAMINOPHEN 325 MG/1
TABLET ORAL
Status: DISPENSED
Start: 2017-09-29

## (undated) RX ORDER — HYDROMORPHONE HYDROCHLORIDE 1 MG/ML
INJECTION, SOLUTION INTRAMUSCULAR; INTRAVENOUS; SUBCUTANEOUS
Status: DISPENSED
Start: 2017-09-29

## (undated) RX ORDER — PROPOFOL 10 MG/ML
INJECTION, EMULSION INTRAVENOUS
Status: DISPENSED
Start: 2017-09-29

## (undated) RX ORDER — ONDANSETRON 2 MG/ML
INJECTION INTRAMUSCULAR; INTRAVENOUS
Status: DISPENSED
Start: 2017-09-29

## (undated) RX ORDER — FENTANYL CITRATE 50 UG/ML
INJECTION, SOLUTION INTRAMUSCULAR; INTRAVENOUS
Status: DISPENSED
Start: 2017-09-29

## (undated) RX ORDER — GLYCOPYRROLATE 0.2 MG/ML
INJECTION, SOLUTION INTRAMUSCULAR; INTRAVENOUS
Status: DISPENSED
Start: 2017-09-29

## (undated) RX ORDER — DEXAMETHASONE SODIUM PHOSPHATE 4 MG/ML
INJECTION, SOLUTION INTRA-ARTICULAR; INTRALESIONAL; INTRAMUSCULAR; INTRAVENOUS; SOFT TISSUE
Status: DISPENSED
Start: 2017-09-29

## (undated) RX ORDER — OXYCODONE AND ACETAMINOPHEN 5; 325 MG/1; MG/1
TABLET ORAL
Status: DISPENSED
Start: 2017-09-29

## (undated) RX ORDER — HYDROXYZINE HYDROCHLORIDE 25 MG/1
TABLET, FILM COATED ORAL
Status: DISPENSED
Start: 2017-09-29

## (undated) RX ORDER — LIDOCAINE HYDROCHLORIDE 20 MG/ML
INJECTION, SOLUTION EPIDURAL; INFILTRATION; INTRACAUDAL; PERINEURAL
Status: DISPENSED
Start: 2017-09-29

## (undated) RX ORDER — NEOSTIGMINE METHYLSULFATE 1 MG/ML
VIAL (ML) INJECTION
Status: DISPENSED
Start: 2017-09-29